# Patient Record
Sex: FEMALE | Race: WHITE | NOT HISPANIC OR LATINO | Employment: OTHER | URBAN - METROPOLITAN AREA
[De-identification: names, ages, dates, MRNs, and addresses within clinical notes are randomized per-mention and may not be internally consistent; named-entity substitution may affect disease eponyms.]

---

## 2024-11-22 ENCOUNTER — APPOINTMENT (OUTPATIENT)
Dept: OTHER | Facility: HOSPITAL | Age: 69
End: 2024-11-22
Payer: MEDICARE

## 2024-11-23 ENCOUNTER — INPATIENT HOSPITAL (OUTPATIENT)
Age: 69
End: 2024-11-23
Payer: MEDICARE

## 2024-11-23 ENCOUNTER — HOSPITAL ENCOUNTER (INPATIENT)
Facility: HOSPITAL | Age: 69
LOS: 1 days | Discharge: HOME | End: 2024-11-24
Attending: STUDENT IN AN ORGANIZED HEALTH CARE EDUCATION/TRAINING PROGRAM | Admitting: STUDENT IN AN ORGANIZED HEALTH CARE EDUCATION/TRAINING PROGRAM
Payer: MEDICARE

## 2024-11-23 ENCOUNTER — INPATIENT HOSPITAL (OUTPATIENT)
Dept: URBAN - METROPOLITAN AREA HOSPITAL 84 | Facility: HOSPITAL | Age: 69
End: 2024-11-23
Payer: MEDICARE

## 2024-11-23 ENCOUNTER — APPOINTMENT (OUTPATIENT)
Dept: GENERAL RADIOLOGY | Facility: HOSPITAL | Age: 69
End: 2024-11-23
Payer: MEDICARE

## 2024-11-23 DIAGNOSIS — R74.8 ABNORMAL LEVELS OF OTHER SERUM ENZYMES: ICD-10-CM

## 2024-11-23 PROBLEM — R10.9 INTRACTABLE ABDOMINAL PAIN: Status: ACTIVE | Noted: 2024-11-23

## 2024-11-23 LAB
ALBUMIN SERPL-MCNC: 3.9 G/DL (ref 3.5–5.2)
ALBUMIN/GLOB SERPL: 1.4 G/DL
ALP SERPL-CCNC: 79 U/L (ref 39–117)
ALT SERPL W P-5'-P-CCNC: 14 U/L (ref 1–33)
AMYLASE SERPL-CCNC: 78 U/L (ref 28–100)
ANION GAP SERPL CALCULATED.3IONS-SCNC: 5.5 MMOL/L (ref 5–15)
APTT PPP: 24 SECONDS (ref 22.7–35.4)
AST SERPL-CCNC: 13 U/L (ref 1–32)
BILIRUB SERPL-MCNC: 0.8 MG/DL (ref 0–1.2)
BUN SERPL-MCNC: 12 MG/DL (ref 8–23)
BUN/CREAT SERPL: 12.4 (ref 7–25)
CALCIUM SPEC-SCNC: 9.4 MG/DL (ref 8.6–10.5)
CHLORIDE SERPL-SCNC: 100 MMOL/L (ref 98–107)
CHOLEST SERPL-MCNC: 161 MG/DL (ref 0–200)
CK SERPL-CCNC: 29 U/L (ref 20–180)
CO2 SERPL-SCNC: 29.5 MMOL/L (ref 22–29)
CREAT SERPL-MCNC: 0.97 MG/DL (ref 0.57–1)
CRP SERPL-MCNC: 8.89 MG/DL (ref 0–0.5)
D-LACTATE SERPL-SCNC: 0.8 MMOL/L (ref 0.5–2)
DEPRECATED RDW RBC AUTO: 44.6 FL (ref 37–54)
EGFRCR SERPLBLD CKD-EPI 2021: 63 ML/MIN/1.73
EOSINOPHIL # BLD MANUAL: 0.34 10*3/MM3 (ref 0–0.4)
EOSINOPHIL NFR BLD MANUAL: 3 % (ref 0.3–6.2)
ERYTHROCYTE [DISTWIDTH] IN BLOOD BY AUTOMATED COUNT: 12.7 % (ref 12.3–15.4)
GEN 5 2HR TROPONIN T REFLEX: 12 NG/L
GLOBULIN UR ELPH-MCNC: 2.7 GM/DL
GLUCOSE SERPL-MCNC: 143 MG/DL (ref 65–99)
HBA1C MFR BLD: 6.29 % (ref 4.8–5.6)
HCT VFR BLD AUTO: 37.9 % (ref 34–46.6)
HDLC SERPL-MCNC: 70 MG/DL (ref 40–60)
HGB BLD-MCNC: 12.8 G/DL (ref 12–15.9)
INR PPP: 1.01 (ref 0.9–1.1)
LARGE PLATELETS: ABNORMAL
LDLC SERPL CALC-MCNC: 78 MG/DL (ref 0–100)
LDLC/HDLC SERPL: 1.11 {RATIO}
LIPASE SERPL-CCNC: 88 U/L (ref 13–60)
LYMPHOCYTES # BLD MANUAL: 2.64 10*3/MM3 (ref 0.7–3.1)
LYMPHOCYTES NFR BLD MANUAL: 4 % (ref 5–12)
MAGNESIUM SERPL-MCNC: 2 MG/DL (ref 1.6–2.4)
MCH RBC QN AUTO: 32.2 PG (ref 26.6–33)
MCHC RBC AUTO-ENTMCNC: 33.8 G/DL (ref 31.5–35.7)
MCV RBC AUTO: 95.5 FL (ref 79–97)
MONOCYTES # BLD: 0.46 10*3/MM3 (ref 0.1–0.9)
NEUTROPHILS # BLD AUTO: 8.04 10*3/MM3 (ref 1.7–7)
NEUTROPHILS NFR BLD MANUAL: 67 % (ref 42.7–76)
NEUTS BAND NFR BLD MANUAL: 3 % (ref 0–5)
NEUTS VAC BLD QL SMEAR: ABNORMAL
NT-PROBNP SERPL-MCNC: 144 PG/ML (ref 0–900)
PHOSPHATE SERPL-MCNC: 3.8 MG/DL (ref 2.5–4.5)
PLATELET # BLD AUTO: 214 10*3/MM3 (ref 140–450)
PMV BLD AUTO: 10.8 FL (ref 6–12)
POTASSIUM SERPL-SCNC: 4.9 MMOL/L (ref 3.5–5.2)
PROCALCITONIN SERPL-MCNC: 0.06 NG/ML (ref 0–0.25)
PROT SERPL-MCNC: 6.6 G/DL (ref 6–8.5)
PROTHROMBIN TIME: 13.4 SECONDS (ref 11.7–14.2)
RBC # BLD AUTO: 3.97 10*6/MM3 (ref 3.77–5.28)
RBC MORPH BLD: NORMAL
SODIUM SERPL-SCNC: 135 MMOL/L (ref 136–145)
TRIGL SERPL-MCNC: 65 MG/DL (ref 0–150)
TROPONIN T DELTA: 5 NG/L
TROPONIN T SERPL HS-MCNC: 7 NG/L
TROPONIN T SERPL HS-MCNC: 8 NG/L
TSH SERPL DL<=0.05 MIU/L-ACNC: 1.37 UIU/ML (ref 0.27–4.2)
VARIANT LYMPHS NFR BLD MANUAL: 19 % (ref 19.6–45.3)
VARIANT LYMPHS NFR BLD MANUAL: 4 % (ref 0–5)
VLDLC SERPL-MCNC: 13 MG/DL (ref 5–40)
WBC NRBC COR # BLD AUTO: 11.49 10*3/MM3 (ref 3.4–10.8)

## 2024-11-23 PROCEDURE — 99221 1ST HOSP IP/OBS SF/LOW 40: CPT | Performed by: NURSE PRACTITIONER

## 2024-11-23 PROCEDURE — 25810000003 LACTATED RINGERS PER 1000 ML: Performed by: STUDENT IN AN ORGANIZED HEALTH CARE EDUCATION/TRAINING PROGRAM

## 2024-11-23 PROCEDURE — 83690 ASSAY OF LIPASE: CPT | Performed by: NURSE PRACTITIONER

## 2024-11-23 PROCEDURE — 84484 ASSAY OF TROPONIN QUANT: CPT | Performed by: NURSE PRACTITIONER

## 2024-11-23 PROCEDURE — 83735 ASSAY OF MAGNESIUM: CPT | Performed by: NURSE PRACTITIONER

## 2024-11-23 PROCEDURE — 83605 ASSAY OF LACTIC ACID: CPT | Performed by: NURSE PRACTITIONER

## 2024-11-23 PROCEDURE — 99221 1ST HOSP IP/OBS SF/LOW 40: CPT | Performed by: SURGERY

## 2024-11-23 PROCEDURE — 71045 X-RAY EXAM CHEST 1 VIEW: CPT

## 2024-11-23 PROCEDURE — 97162 PT EVAL MOD COMPLEX 30 MIN: CPT

## 2024-11-23 PROCEDURE — 80053 COMPREHEN METABOLIC PANEL: CPT | Performed by: NURSE PRACTITIONER

## 2024-11-23 PROCEDURE — 84100 ASSAY OF PHOSPHORUS: CPT | Performed by: NURSE PRACTITIONER

## 2024-11-23 PROCEDURE — 84145 PROCALCITONIN (PCT): CPT | Performed by: NURSE PRACTITIONER

## 2024-11-23 PROCEDURE — 82550 ASSAY OF CK (CPK): CPT | Performed by: NURSE PRACTITIONER

## 2024-11-23 PROCEDURE — 83036 HEMOGLOBIN GLYCOSYLATED A1C: CPT | Performed by: NURSE PRACTITIONER

## 2024-11-23 PROCEDURE — 63710000001 PREDNISONE PER 1 MG: Performed by: NURSE PRACTITIONER

## 2024-11-23 PROCEDURE — 85007 BL SMEAR W/DIFF WBC COUNT: CPT | Performed by: NURSE PRACTITIONER

## 2024-11-23 PROCEDURE — 80061 LIPID PANEL: CPT | Performed by: NURSE PRACTITIONER

## 2024-11-23 PROCEDURE — 84443 ASSAY THYROID STIM HORMONE: CPT | Performed by: NURSE PRACTITIONER

## 2024-11-23 PROCEDURE — 82150 ASSAY OF AMYLASE: CPT | Performed by: NURSE PRACTITIONER

## 2024-11-23 PROCEDURE — 86140 C-REACTIVE PROTEIN: CPT | Performed by: NURSE PRACTITIONER

## 2024-11-23 PROCEDURE — 85730 THROMBOPLASTIN TIME PARTIAL: CPT | Performed by: NURSE PRACTITIONER

## 2024-11-23 PROCEDURE — 85610 PROTHROMBIN TIME: CPT | Performed by: NURSE PRACTITIONER

## 2024-11-23 PROCEDURE — 83880 ASSAY OF NATRIURETIC PEPTIDE: CPT | Performed by: NURSE PRACTITIONER

## 2024-11-23 PROCEDURE — 85027 COMPLETE CBC AUTOMATED: CPT | Performed by: NURSE PRACTITIONER

## 2024-11-23 RX ORDER — PREDNISONE 20 MG/1
40 TABLET ORAL
Status: DISCONTINUED | OUTPATIENT
Start: 2024-11-23 | End: 2024-11-23

## 2024-11-23 RX ORDER — AMOXICILLIN 250 MG
2 CAPSULE ORAL 2 TIMES DAILY PRN
Status: DISCONTINUED | OUTPATIENT
Start: 2024-11-23 | End: 2024-11-24 | Stop reason: HOSPADM

## 2024-11-23 RX ORDER — ATORVASTATIN CALCIUM 10 MG/1
10 TABLET, FILM COATED ORAL DAILY
Status: DISCONTINUED | OUTPATIENT
Start: 2024-11-23 | End: 2024-11-24 | Stop reason: HOSPADM

## 2024-11-23 RX ORDER — SORBITOL SOLUTION 70 %
100 SOLUTION, ORAL MISCELLANEOUS ONCE
Status: COMPLETED | OUTPATIENT
Start: 2024-11-23 | End: 2024-11-23

## 2024-11-23 RX ORDER — ACETAMINOPHEN 325 MG/1
650 TABLET ORAL EVERY 4 HOURS PRN
Status: DISCONTINUED | OUTPATIENT
Start: 2024-11-23 | End: 2024-11-24 | Stop reason: HOSPADM

## 2024-11-23 RX ORDER — KETOROLAC TROMETHAMINE 30 MG/ML
15 INJECTION, SOLUTION INTRAMUSCULAR; INTRAVENOUS EVERY 6 HOURS PRN
Status: DISCONTINUED | OUTPATIENT
Start: 2024-11-23 | End: 2024-11-24 | Stop reason: HOSPADM

## 2024-11-23 RX ORDER — LEVOTHYROXINE SODIUM 25 UG/1
75 TABLET ORAL
Status: DISCONTINUED | OUTPATIENT
Start: 2024-11-23 | End: 2024-11-24 | Stop reason: HOSPADM

## 2024-11-23 RX ORDER — LEVOTHYROXINE SODIUM 50 UG/1
50 TABLET ORAL DAILY
COMMUNITY

## 2024-11-23 RX ORDER — LEVOTHYROXINE SODIUM 50 UG/1
50 TABLET ORAL DAILY
Status: DISCONTINUED | OUTPATIENT
Start: 2024-11-23 | End: 2024-11-23 | Stop reason: SDUPTHER

## 2024-11-23 RX ORDER — AMLODIPINE BESYLATE 5 MG/1
5 TABLET ORAL DAILY
COMMUNITY

## 2024-11-23 RX ORDER — HYDROMORPHONE HYDROCHLORIDE 1 MG/ML
0.5 INJECTION, SOLUTION INTRAMUSCULAR; INTRAVENOUS; SUBCUTANEOUS EVERY 4 HOURS PRN
Status: DISCONTINUED | OUTPATIENT
Start: 2024-11-23 | End: 2024-11-24 | Stop reason: HOSPADM

## 2024-11-23 RX ORDER — AMLODIPINE BESYLATE 5 MG/1
5 TABLET ORAL DAILY
Status: DISCONTINUED | OUTPATIENT
Start: 2024-11-23 | End: 2024-11-24 | Stop reason: HOSPADM

## 2024-11-23 RX ORDER — POLYETHYLENE GLYCOL 3350 17 G/17G
17 POWDER, FOR SOLUTION ORAL DAILY PRN
Status: DISCONTINUED | OUTPATIENT
Start: 2024-11-23 | End: 2024-11-24 | Stop reason: HOSPADM

## 2024-11-23 RX ORDER — SODIUM CHLORIDE 0.9 % (FLUSH) 0.9 %
10 SYRINGE (ML) INJECTION AS NEEDED
Status: DISCONTINUED | OUTPATIENT
Start: 2024-11-23 | End: 2024-11-24 | Stop reason: HOSPADM

## 2024-11-23 RX ORDER — LEVOTHYROXINE SODIUM 25 UG/1
25 TABLET ORAL
COMMUNITY

## 2024-11-23 RX ORDER — ONDANSETRON 2 MG/ML
4 INJECTION INTRAMUSCULAR; INTRAVENOUS EVERY 6 HOURS PRN
Status: DISCONTINUED | OUTPATIENT
Start: 2024-11-23 | End: 2024-11-24 | Stop reason: HOSPADM

## 2024-11-23 RX ORDER — ASPIRIN 81 MG/1
81 TABLET ORAL DAILY
COMMUNITY

## 2024-11-23 RX ORDER — ATORVASTATIN CALCIUM 10 MG/1
10 TABLET, FILM COATED ORAL DAILY
COMMUNITY

## 2024-11-23 RX ORDER — ASPIRIN 81 MG/1
81 TABLET ORAL DAILY
Status: DISCONTINUED | OUTPATIENT
Start: 2024-11-23 | End: 2024-11-24 | Stop reason: HOSPADM

## 2024-11-23 RX ORDER — SODIUM CHLORIDE, SODIUM LACTATE, POTASSIUM CHLORIDE, CALCIUM CHLORIDE 600; 310; 30; 20 MG/100ML; MG/100ML; MG/100ML; MG/100ML
100 INJECTION, SOLUTION INTRAVENOUS CONTINUOUS
Status: DISPENSED | OUTPATIENT
Start: 2024-11-23 | End: 2024-11-24

## 2024-11-23 RX ORDER — NITROGLYCERIN 0.4 MG/1
0.4 TABLET SUBLINGUAL
Status: DISCONTINUED | OUTPATIENT
Start: 2024-11-23 | End: 2024-11-24 | Stop reason: HOSPADM

## 2024-11-23 RX ORDER — SODIUM CHLORIDE 9 MG/ML
40 INJECTION, SOLUTION INTRAVENOUS AS NEEDED
Status: DISCONTINUED | OUTPATIENT
Start: 2024-11-23 | End: 2024-11-24 | Stop reason: HOSPADM

## 2024-11-23 RX ORDER — SODIUM CHLORIDE 0.9 % (FLUSH) 0.9 %
10 SYRINGE (ML) INJECTION EVERY 12 HOURS SCHEDULED
Status: DISCONTINUED | OUTPATIENT
Start: 2024-11-23 | End: 2024-11-24 | Stop reason: HOSPADM

## 2024-11-23 RX ORDER — DICYCLOMINE HYDROCHLORIDE 10 MG/1
20 CAPSULE ORAL 4 TIMES DAILY PRN
Status: DISCONTINUED | OUTPATIENT
Start: 2024-11-23 | End: 2024-11-24 | Stop reason: HOSPADM

## 2024-11-23 RX ORDER — PANTOPRAZOLE SODIUM 40 MG/10ML
40 INJECTION, POWDER, LYOPHILIZED, FOR SOLUTION INTRAVENOUS
Status: DISCONTINUED | OUTPATIENT
Start: 2024-11-23 | End: 2024-11-24 | Stop reason: HOSPADM

## 2024-11-23 RX ORDER — BISACODYL 10 MG
10 SUPPOSITORY, RECTAL RECTAL DAILY PRN
Status: DISCONTINUED | OUTPATIENT
Start: 2024-11-23 | End: 2024-11-24 | Stop reason: HOSPADM

## 2024-11-23 RX ORDER — ALBUTEROL SULFATE 90 UG/1
1 INHALANT RESPIRATORY (INHALATION) EVERY 4 HOURS PRN
COMMUNITY

## 2024-11-23 RX ORDER — BISACODYL 5 MG/1
5 TABLET, DELAYED RELEASE ORAL DAILY PRN
Status: DISCONTINUED | OUTPATIENT
Start: 2024-11-23 | End: 2024-11-24 | Stop reason: HOSPADM

## 2024-11-23 RX ORDER — LANOLIN ALCOHOL/MO/W.PET/CERES
1000 CREAM (GRAM) TOPICAL DAILY
COMMUNITY

## 2024-11-23 RX ADMIN — ATORVASTATIN CALCIUM 10 MG: 10 TABLET, FILM COATED ORAL at 08:22

## 2024-11-23 RX ADMIN — ASPIRIN 81 MG: 81 TABLET, COATED ORAL at 08:23

## 2024-11-23 RX ADMIN — Medication 10 ML: at 01:52

## 2024-11-23 RX ADMIN — SODIUM CHLORIDE, SODIUM LACTATE, POTASSIUM CHLORIDE, CALCIUM CHLORIDE 100 ML/HR: 20; 30; 600; 310 INJECTION, SOLUTION INTRAVENOUS at 13:24

## 2024-11-23 RX ADMIN — SODIUM CHLORIDE, SODIUM LACTATE, POTASSIUM CHLORIDE, CALCIUM CHLORIDE 100 ML/HR: 20; 30; 600; 310 INJECTION, SOLUTION INTRAVENOUS at 23:05

## 2024-11-23 RX ADMIN — PANTOPRAZOLE SODIUM 40 MG: 40 INJECTION, POWDER, FOR SOLUTION INTRAVENOUS at 02:02

## 2024-11-23 RX ADMIN — LEVOTHYROXINE SODIUM 75 MCG: 0.03 TABLET ORAL at 06:49

## 2024-11-23 RX ADMIN — Medication 10 ML: at 10:35

## 2024-11-23 RX ADMIN — AMLODIPINE BESYLATE 5 MG: 5 TABLET ORAL at 08:23

## 2024-11-23 RX ADMIN — PREDNISONE 40 MG: 20 TABLET ORAL at 08:22

## 2024-11-23 RX ADMIN — SORBITOL SOLUTION (BULK) 100 ML: 70 SOLUTION at 13:24

## 2024-11-23 NOTE — THERAPY EVALUATION
Patient Name: Brandie Maldonado  : 1955    MRN: [id number]                              Today's Date: 2024       Admit Date: 2024    Visit Dx: No diagnosis found.  Patient Active Problem List   Diagnosis    Intractable abdominal pain     Past Medical History:   Diagnosis Date    Arthritis     COPD (chronic obstructive pulmonary disease)     Elevated cholesterol     Hypertension     Hypothyroid      Past Surgical History:   Procedure Laterality Date    APPENDECTOMY      1980s    CHOLECYSTECTOMY          HYSTERECTOMY  1970    partial hysterectomy      General Information       Row Name 24 183          Physical Therapy Time and Intention    Document Type evaluation  -EL     Mode of Treatment individual therapy;physical therapy  -       Row Name 24 183          General Information    Prior Level of Function independent:  -       Row Name 24 183          Living Environment    People in Home significant other;grandchild(silvestre)  -       Row Name 24 183          Cognition    Orientation Status (Cognition) oriented x 4  -       Row Name 24          Safety Issues/Impairments Affecting Functional Mobility    Impairments Affecting Function (Mobility) pain  -EL               User Key  (r) = Recorded By, (t) = Taken By, (c) = Cosigned By      Initials Name Provider Type    EL Elias Smallwood, PT Physical Therapist                   Mobility       Row Name 24          Bed Mobility    Bed Mobility bed mobility (all) activities  -EL     All Activities, [place] (Bed Mobility) modified independence  -     Assistive Device (Bed Mobility) bed rails  -       Row Name 24 183          Bed-Chair Transfer    Bed-Chair [place] (Transfers) standby assist  -       Row Name 24 183          Sit-Stand Transfer    Sit-Stand [place] (Transfers) standby assist  -       Row Name 24 183          Gait/Stairs (Locomotion)    [place] Level  (Gait) standby assist  -EL     Distance in Feet (Gait) 150  -EL     Comment, (Gait/Stairs) No significant balance deficits  -EL               User Key  (r) = Recorded By, (t) = Taken By, (c) = Cosigned By      Initials Name Provider Type    Elias Cid, PT Physical Therapist                   Obj/Interventions       Row Name 11/23/24 1836          Range of Motion Comprehensive    General Range of Motion bilateral lower extremity ROM WFL  -EL       Row Name 11/23/24 1836          Strength Comprehensive (MMT)    General Manual Muscle Testing (MMT) Assessment no strength deficits identified  -EL     Comment, General Manual Muscle Testing (MMT) Assessment BLE 4/5 gross  -EL       Row Name 11/23/24 1836          Sensory Assessment (Somatosensory)    Sensory Assessment (Somatosensory) sensation intact  -EL               User Key  (r) = Recorded By, (t) = Taken By, (c) = Cosigned By      Initials Name Provider Type    Elias Cid, PT Physical Therapist                   Goals/Plan    No documentation.                  Clinical Impression       Row Name 11/23/24 1837          Pain    Pretreatment Pain Rating 1/10  -EL     Posttreatment Pain Rating 1/10  -EL     Pain Location abdomen  -       Row Name 11/23/24 1837          Plan of Care Review    Plan of Care Reviewed With patient;significant other  -EL     Outcome Evaluation Pt is a 69 YO F admitted from [facility], with abdominal pain. Pt reports this date it is at 1/10, and was awaiting GI and gen sx consult. Pt reports living at home with SO and 3 grandchildren is independent with all ADLs, ambluation without AD and no recnet falls. Pt is a caregiver for 4 elderly ladies as well. This date pt requires no physical assistance with bed mobility, tranfsers or ambulation. Pt appears to be safe to return home wiht SO. PT to sign off at this time, and if status changes new orders required.  -EL       Row Name 11/23/24 1837          Therapy Assessment/Plan (PT)     Criteria for Skilled Interventions Met (PT) no problems identified which require skilled intervention  -EL     Therapy Frequency (PT) evaluation only  -EL       Row Name 11/23/24 1837          Vital Signs    O2 Delivery Pre Treatment room air  -EL     O2 Delivery Intra Treatment room air  -EL     O2 Delivery Post Treatment room air  -EL     Pre Patient Position Supine  -EL     Intra Patient Position Standing  -EL     Post Patient Position Sitting  -EL       Row Name 11/23/24 1837          Positioning and Restraints    Pre-Treatment Position in bed  -EL     Post Treatment Position chair  -EL     In Chair notified nsg;reclined;call light within reach;encouraged to call for assist;exit alarm on  -EL               User Key  (r) = Recorded By, (t) = Taken By, (c) = Cosigned By      Initials Name Provider Type    Elias Cid, PT Physical Therapist                   Outcome Measures       Row Name 11/23/24 1843 11/23/24 0823       How much help from another person do you currently need...    Turning from your back to your side while in flat bed without using bedrails? 4  -EL 4  -HT (r) RR (t) HT (c)    Moving from lying on back to sitting on the side of a flat bed without bedrails? 4  -EL 4  -HT (r) RR (t) HT (c)    Moving to and from a bed to a chair (including a wheelchair)? 4  -EL 4  -HT (r) RR (t) HT (c)    Standing up from a chair using your arms (e.g., wheelchair, bedside chair)? 4  -EL 4  -HT (r) RR (t) HT (c)    Climbing 3-5 steps with a railing? 4  -EL 4  -HT (r) RR (t) HT (c)    To walk in hospital room? 4  -EL 3  -HT (r) RR (t) HT (c)    AM-PAC 6 Clicks Score (PT) 24  -EL 23  -RR    Highest Level of Mobility Goal 8 --> Walked 250 feet or more  -EL 7 --> Walk 25 feet or more  -RR      Row Name 11/23/24 1843          Functional Assessment    Outcome Measure Options AM-PAC 6 Clicks Basic Mobility (PT)  -EL               User Key  (r) = Recorded By, (t) = Taken By, (c) = Cosigned By      Initials Name Provider  Type    EL Elias Smallwood, PT Physical Therapist    Pauline Alvarado RN Registered Nurse    Zayra White LPN Licensed Nurse                                 Physical Therapy Education       Title: PT OT SLP Therapies (Done)       Topic: Physical Therapy (Done)       Point: Mobility training (Done)       Learning Progress Summary            Patient Acceptance, E,TB, VU by  at 11/23/2024 1843                      Point: Precautions (Done)       Learning Progress Summary            Patient Acceptance, E,TB, VU by  at 11/23/2024 1843                                      User Key       Initials Effective Dates Name Provider Type Discipline     06/23/20 -  Elias Smallwood, PT Physical Therapist PT                  PT Recommendation and Plan     Outcome Evaluation: Pt is a 67 YO F admitted from [facility], with abdominal pain. Pt reports this date it is at 1/10, and was awaiting GI and gen sx consult. Pt reports living at home with SO and 3 grandchildren is independent with all ADLs, ambluation without AD and no recnet falls. Pt is a caregiver for 4 elderly ladies as well. This date pt requires no physical assistance with bed mobility, tranfsers or ambulation. Pt appears to be safe to return home wiht SO. PT to sign off at this time, and if status changes new orders required.     Time Calculation:   PT Evaluation Complexity  History, PT Evaluation Complexity: 1-2 personal factors and/or comorbidities  Examination of Body Systems (PT Eval Complexity): total of 3 or more elements  Clinical Presentation (PT Evaluation Complexity): evolving  Clinical Decision Making (PT Evaluation Complexity): moderate complexity  Overall Complexity (PT Evaluation Complexity): moderate complexity     PT Charges       Row Name 11/23/24 1843             Time Calculation    Start Time 0947  -EL      Stop Time 1000  -EL      Time Calculation (min) 13 min  -EL      PT Received On 11/23/24  -EL                User Key  (r) =  Recorded By, (t) = Taken By, (c) = Cosigned By      Initials Name Provider Type    Elias Cid, PT Physical Therapist                  Therapy Charges for Today       Code Description Service Date Service Provider Modifiers Qty    93419022575 HC PT EVAL MOD COMPLEXITY 3 11/23/2024 Elias Smallwood, PT GP 1            PT G-Codes  Outcome Measure Options: AM-PAC 6 Clicks Basic Mobility (PT)  AM-PAC 6 Clicks Score (PT): 24  PT Discharge Summary  Anticipated Discharge Disposition (PT): home with assist    Elias Smallwood PT  11/23/2024

## 2024-11-23 NOTE — PLAN OF CARE
Goal Outcome Evaluation:         Patient admitted for abdominal pain, radiating to right back. Abdomen is soft, tender to touch. Denies any need for pain medication during this assessment. Heart/oxygen monitor in place. Plan of care initiated.  Patient is alert and oriented x 4. Significant other is at bedside.

## 2024-11-23 NOTE — PLAN OF CARE
Goal Outcome Evaluation:  Plan of Care Reviewed With: patient, significant other           Outcome Evaluation: Pt is a 69 YO F admitted from [facility] hospital, with abdominal pain. Pt reports this date it is at 1/10, and was awaiting GI and gen sx consult. Pt reports living at home with SO and 3 grandchildren is independent with all ADLs, ambluation without AD and no recnet falls. Pt is a caregiver for 4 elderly ladies as well. This date pt requires no physical assistance with bed mobility, tranfsers or ambulation. Pt appears to be safe to return home wiht SO. PT to sign off at this time, and if status changes new orders required.    Anticipated Discharge Disposition (PT): home with assist

## 2024-11-23 NOTE — H&P
"[facility] Medicine Services  History & Physical    Patient Name: Brandie Maldonado  : 1955  MRN: [id number]  Primary Care Physician:  Stephanie Arciniega  Date of admission: 2024  Date and Time of Service: 2024 at 0055    Subjective      Chief Complaint: abdominal pain    History of Present Illness: Brandie Maldonado is a 68 y.o. female with a CMH of GERD, hypothyroidism, HLD, HTN, cholecystectomy, hysterectomy, appendectomy who presented to [facility] on 2024 with abdominal pain. Lives at home, independent with ADLs.   Was seen at [facility] on 10/31 for PNA, treated with oral antibiotics and steroids.   Presented to [facility] ED on  for right sided abdominal pain and right flank pain over past 24 hours accompanied with nausea. Denies injury or trauma. Work up at OSH revealed CXR negative, UA negative, troponin negative at 4, BNP normal at 44, BUN 16, creatinine 0.81, lactate 1.2, lipase 950, magnesium 1.9, WBC 13.3, Hgb 42.8, UDS negative. CT abd/pelvis revealed moderate hiatal hernia, no free intraperitoneal air or fluid, increased attenuation in mesentery with small to borderline size lymph nodes suggestive of \"iam mesentery\". Due to intractable abdominal pain, transfer to this facility requested for higher level of care.     Upon evaluation, awake, alert, resting in bed, SO at bedside. Current VS: 98.2-60-/76-97% room air. Rates abdominal pain 3/10 after IV fentanyl at OSH. On exam with diffuse abdominal tenderness worse RLQ. Denies blood in stool or urine.   Admission labs pending.       Review of Systems   Constitutional:  Positive for fatigue. Negative for chills and fever.   Respiratory:  Negative for shortness of breath.    Cardiovascular:  Negative for chest pain.   Gastrointestinal:  Positive for abdominal pain and nausea. Negative for blood in stool, diarrhea and vomiting.   Genitourinary:  Negative for dysuria.   Musculoskeletal:         Right " flank pain   Neurological:  Positive for weakness.       Personal History     PMH: HTN, HLD, hypothyroidism.     PSH: appendectomy, hysterectomy, cholecystectomy.     Family History: family history is not on file. Otherwise pertinent FHx was reviewed and not pertinent to current issue.    Social History:      Home Medications:  Prior to Admission Medications       None              Allergies:  No Known Allergies    Objective      Vitals:   Temp:  [98.2 °F (36.8 °C)] 98.2 °F (36.8 °C)  Heart Rate:  [60] 60  Resp:  [15] 15  BP: (132)/(76) 132/76  Body mass index is 36.79 kg/m².    Physical Exam  Constitutional:       Appearance: awake, alert, APPEARS WEAK.   HENT:      Head: Normocephalic and atraumatic.      Mouth/Throat:      Mouth: Mucous membranes are moist.   Eyes:      General: No scleral icterus.     Extraocular Movements: Extraocular movements intact.      Pupils: Pupils are equal, round, and reactive to light.   Cardiovascular:      Rate and Rhythm: Normal rate and regular rhythm.      Pulses: Normal pulses.      Heart sounds: Normal heart sounds.   Pulmonary:      Effort: Pulmonary effort is normal.      Breath sounds: Normal breath sounds.   Abdominal:      General: Bowel sounds are normal. There is no distension.      Palpations: Abdomen is soft.      Tenderness: DIFFUSE ABDOMINAL TENDERNESS WORSE RLQ WITHOUT GUARDING OR REBOUND TENDERNESS.   Musculoskeletal:      Right lower leg: no edema.      Left lower leg: No edema.   Skin:     General: Skin is warm and dry. PALE, SLIGHTLY SALLOW    Neurological:      Mental Status: She is alert and oriented to person, place, and time. Mental status is at baseline.      Motor: Weakness present.   Diagnostic Data:  Lab Results (last 24 hours)       ** No results found for the last 24 hours. **             Imaging Results (Last 24 Hours)       ** No results found for the last 24 hours. **              Assessment & Plan        This is a 68 y.o. female with PMH of GERD,  "hypothyroidism, HLD, HTN, cholecystectomy, hysterectomy, appendectomy who presented to OSH for right sided abdominal pain, right flank pain with nausea for 24 hours. OSH workup revealed WBC 13.3 and CT imaging of increased attenuation in mesentery with small to borderline size lymph nodes suggestive of \"iam mesentery\". Transfer to this facility requested for intractable abdominal pain.         Active and Resolved Problems  Active Hospital Problems    Diagnosis  POA    **Intractable abdominal pain [R10.9]  Yes      Resolved Hospital Problems   No resolved problems to display.     Intractable abdominal pain:  -presented to OSH with right sided abdominal and flank pain over past 24 hour with nausea.   -OSH CT revealed increased attenuation in mesentery with small to borderline size lymph nodes suggestive of \"iam mesentery\"  -OSH lipase 950, repeat on admission. OSH CT revealed unremarkable pancreas.   -does not meet SIRS criteria on admission  -admission labs pending, including CPR and ESR  -pain control  -antiemetics   -Prednisone 40 mg po daily  -consider hem/onc consult pending clinical course.   -consider general surgery consult pending clinical course.     Hypothyroidism:  -TSH pending  -continue home levothyroxine dosing once verified by pharmacy.     GERD:  -Protonix for GI PPx            VTE Prophylaxis:  Mechanical VTE prophylaxis orders are present.        The patient desires to be as follows:    CODE STATUS:    Medical Intervention Limits: No intubation (DNI)  Code Status (Patient has no pulse and is not breathing): No CPR (Do Not Attempt to Resuscitate)  Medical Interventions (Patient has pulse or is breathing): Limited Support      Admission Status:  I believe this patient meets IP status.    Expected Length of Stay: 3 days    PDMP and Medication Dispenses via Sidebar reviewed and consistent with patient reported medications.    I discussed the patient's findings and my recommendations with patient, " family, and nursing staff.      Signature:     This document has been electronically signed by ZOHAIB Grewal on November 23, 2024 01:29 [facility] Hospitalist Team

## 2024-11-23 NOTE — CONSULTS
"GI CONSULT  NOTE:    Referring Provider:    Dr Lopez      Chief complaint:    Elevated lipase, mesenteric lymphadenopathy    Subjective   Right sided abdominal/flank pain     History of present illness:     Patient is a 68-year-old female with past medical history of GERD, cholecystectomy 2021, hypothyroidism, hypertension, COPD who went to [facility] 11/22/2024 with a complaint of right abdominal pain/right flank pain and nausea.  Patient had symptoms for 24 hours prior to going to ER.  Patient was evaluated in the ER had a lipase of 950.  CT showed moderate hiatal hernia and iam mesentery.  Patient was transferred to [facility] on 11/23/2024 for further evaluation.  Patient was evaluated by general surgery which ordered GI consult.  Dates her symptoms started suddenly on Friday, 11/22/2024.  Patient states it felt like she was bloated in the right upper quadrant and radiated to her right flank.  Patient states she had a solid brown bowel movement yesterday morning denies any color change or size in her stool.  Denies any blood or black in her stool.  Patient states she felt like the right flank pain was related to a\" sprung muscle.\"  Denies any history of kidney stones.  States symptoms are not exacerbated by eating.  Patient states she was not able to get any comfortable position due to the pain.  Patient did take a couple ibuprofen and also Tums.  Patient denies any routine NSAID usage.  Patient states every time she took the Tums she would pass gas.  Patient denies any ill contacts, buffets, uncooked or undercooked food intake.  Denies any fever or chills, headache.  Positive for weight gain.  Denies any dysphagia.    LABS: Sodium 135, creatinine 0.97.  LFTs are normal.  INR 1.  WBCs 11.49, hemoglobin 12.8, platelets 214.  Lipase 88.    Endo History:  Nothing in G med.  EGD 2021 prior to cholecystectomy.  Patient states EGD was negative.  Prior colonoscopy less than 5 years ago with " Dr. Kiran at [facility].  Patient states she thinks she had polyps.  Denies any hemorrhoids or diverticulosis.  She is unsure of recall.    Past Medical History:  Past Medical History:   Diagnosis Date    Arthritis     COPD (chronic obstructive pulmonary disease)     Elevated cholesterol     Hypertension     Hypothyroid        Past Surgical History:  Past Surgical History:   Procedure Laterality Date    APPENDECTOMY      1980s    CHOLECYSTECTOMY      2021    HYSTERECTOMY  1970    partial hysterectomy       Social History: Patient denies any alcohol, tobacco, illicit drugs  Social History     Tobacco Use    Smoking status: Never    Smokeless tobacco: Never   Vaping Use    Vaping status: Never Used   Substance Use Topics    Alcohol use: Never    Drug use: Never       Family History: Patient denies any history of GI cancers or disorders.  Father had lung cancer and mom had heart issues.  History reviewed. No pertinent family history.    Medications:  Medications Prior to Admission   Medication Sig Dispense Refill Last Dose/Taking    atorvastatin (LIPITOR) 10 MG tablet Take 1 tablet by mouth Daily.   11/22/2024    albuterol sulfate  (90 Base) MCG/ACT inhaler Inhale 1 puff Every 4 (Four) Hours As Needed for Wheezing.       amLODIPine (NORVASC) 5 MG tablet Take 1 tablet by mouth Daily.       aspirin 81 MG EC tablet Take 1 tablet by mouth Daily.       levothyroxine (SYNTHROID, LEVOTHROID) 25 MCG tablet Take 1 tablet by mouth Every Morning.       levothyroxine (SYNTHROID, LEVOTHROID) 50 MCG tablet Take 1 tablet by mouth Daily.       vitamin B-12 (CYANOCOBALAMIN) 1000 MCG tablet Take 1 tablet by mouth Daily.          Scheduled Meds:amLODIPine, 5 mg, Oral, Daily  aspirin, 81 mg, Oral, Daily  atorvastatin, 10 mg, Oral, Daily  levothyroxine, 75 mcg, Oral, Q AM  pantoprazole, 40 mg, Intravenous, Q AM  predniSONE, 40 mg, Oral, Daily With Breakfast  sodium chloride, 10 mL, Intravenous, Q12H      Continuous Infusions:   PRN  "Meds:.  acetaminophen    senna-docusate sodium **AND** polyethylene glycol **AND** bisacodyl **AND** bisacodyl    HYDROmorphone    ketorolac    Magnesium Standard Dose Replacement - Follow Nurse / BPA Driven Protocol    nitroglycerin    ondansetron    Phosphorus Replacement - Follow Nurse / BPA Driven Protocol    Potassium Replacement - Follow Nurse / BPA Driven Protocol    sodium chloride    sodium chloride    ALLERGIES:  Patient has no known allergies.    ROS:  Review of Systems   Constitutional:  Negative for chills, fever and unexpected weight change.   Gastrointestinal:  Positive for abdominal pain and nausea. Negative for anal bleeding, blood in stool, constipation, diarrhea, rectal pain and vomiting.   Genitourinary:  Positive for flank pain.       The following systems were reviewed and negative; positive for weight gain  Constitution:  No fevers, chills, no unintentional weight loss  Skin: no rash, no jaundice  Eyes:  No blurry vision, no eye pain  HENT:  No change in hearing or smell  Resp:  No dyspnea or cough  CV:  No chest pain or palpitations  :  No dysuria, hematuria  Musculoskeletal:  No leg cramps or arthralgias  Neuro:  No tremor, no numbness  Psych:  No depression or confusion    Objective  Rm 4128. Sitting up in chair. NAD. Family briefly present.     Vital Signs:   Vitals:    11/23/24 0114 11/23/24 0443 11/23/24 0823   BP: 132/76 117/70 127/79   BP Location: Right arm Right arm    Patient Position: Lying Lying    Pulse: 60 60 65   Resp: 15 17    Temp: 98.2 °F (36.8 °C) 98.3 °F (36.8 °C)    TempSrc: Oral Oral    SpO2: 97% 95%    Weight: 77.1 kg (170 lb)     Height: 144.8 cm (57\")         Physical Exam:   General Appearance:    Awake and alert, in no acute distress   Head:    Normocephalic, without obvious abnormality, atraumatic   Eyes:            Conjunctivae normal, anicteric sclerae, pupils equal   Ears:    Ears appear intact with no abnormalities noted   Throat:   No oral lesions, no " "thrush, oral mucosa moist   Neck:   Supple, no JVD   Lungs:      respirations regular, even and unlabored        Chest Wall:    No abnormalities observed   Abdomen:       soft, nontender, no rebound or guarding, nondistended, no hepatosplenomegaly   Rectal:     Deferred   Extremities:   Moves all extremities, no edema, no cyanosis   Pulses:   Pulses palpable and equal bilaterally   Skin:   No rash, no jaundice, normal palpation        Neurologic:   Cranial nerves 2 - 12 grossly intact, no asterixis       Results Review:   I reviewed the patient's labs and imaging.  CBC    Results from last 7 days   Lab Units 11/23/24  0230   WBC 10*3/mm3 11.49*   HEMOGLOBIN g/dL 12.8   PLATELETS 10*3/mm3 214     CMP   Results from last 7 days   Lab Units 11/23/24  0230   SODIUM mmol/L 135*   POTASSIUM mmol/L 4.9   CHLORIDE mmol/L 100   CO2 mmol/L 29.5*   BUN mg/dL 12   CREATININE mg/dL 0.97   GLUCOSE mg/dL 143*   ALBUMIN g/dL 3.9   BILIRUBIN mg/dL 0.8   ALK PHOS U/L 79   AST (SGOT) U/L 13   ALT (SGPT) U/L 14   MAGNESIUM mg/dL 2.0   PHOSPHORUS mg/dL 3.8   AMYLASE U/L 78   LIPASE U/L 88*     Cr Clearance Estimated Creatinine Clearance: 49.5 mL/min (by C-G formula based on SCr of 0.97 mg/dL).  Coag   Results from last 7 days   Lab Units 11/23/24  0230   INR  1.01   APTT seconds 24.0     HbA1C   Lab Results   Component Value Date    HGBA1C 6.29 (H) 11/23/2024     Blood Glucose No results found for: \"POCGLU\"  Infection   Results from last 7 days   Lab Units 11/23/24  0230   PROCALCITONIN ng/mL 0.06     UA      Radiology(recent) No radiology results for the last day      ASSESSMENT:  Abdominal pain/flank pain-right  Abnormal CT showing hiatal hernia, iam mesentery  Elevated lipase  GERD  Cholecystectomy 2021  Hypothyroidism  Hypertension  COPD      PLAN:  Patient is 68-year-old female who presented to [facility] on 11/22/2024 for right sided abdominal pain, right flank pain, nausea x 1 day.  Patient was noted to have an elevated " lipase of 950.  CT showed medium hiatal hernia and iam mesentery.  GI was consulted for epigastric pain, increased lipase, mesentery lymphadenopathy.     Patient states sudden onset of right quadrant abdominal pain that radiated to the right flank.  Denies any change in her bowels.  Had nausea but no vomiting.  Lipase was initially 950 at [facility] and is down to 88.  Patient denies her symptoms being worse with eating.  Patient states she felt bloated and passed gas multiple times which helped relieve the pain patient also felt like her right flank pain was related to a muscle.  Will allow low-fat diet.  Patient states her pain and symptoms are 80% better.  Will allow her to eat and see how It affects her times.  Continue PPI.  Bentyl as needed for pain.  Will check a CRP.  Moderate stool noted on CT, will give dose of Sorbitol.   I have requested records from Dr Kiran's office thru our office.     Will discuss assessment and plan with Dr Elmore and change accordingly.      I discussed the patient's findings and my recommendations with the patient.  Pebbles Alston, ZOHAIB  11/23/24  10:10 EST    Time:

## 2024-11-23 NOTE — PROGRESS NOTES
[facility] MEDICINE SERVICE  DAILY PROGRESS NOTE    NAME: Brandie Maldonado  : 1955  MRN: [id number]      LOS: 0 days     PROVIDER OF SERVICE: Fuad Gentile MD    Chief Complaint: Intractable abdominal pain    Subjective:     Interval History:  History taken from: patient  No acute overnight events, patient is aaox3, denies chest pain, SOB, fever or chills, stated she had PNA recently which she was treated for.     Review of Systems:   Review of Systems    Objective:     Vital Signs  Temp:  [98 °F (36.7 °C)-98.5 °F (36.9 °C)] 98 °F (36.7 °C)  Heart Rate:  [60-65] 65  Resp:  [15-17] 15  BP: (117-132)/(70-85) 127/85   Body mass index is 36.79 kg/m².    Physical Exam  General Appearance:  Awake alert   Head:  Atraumatic normocephalic   Eyes:        No sclera icterus   Neck: Normal ROM   Pulm: Clear to auscultation on my exam   Cardio: Heart sounds normal   Extremities: Moves all extremities   Abdomen: Soft tender in RLQ, epigastric, tender to palpation, no rebound on my exam       Musculoskeletal: Moves all extremities   Neuro: Aaox3                Scheduled Meds   amLODIPine, 5 mg, Oral, Daily  aspirin, 81 mg, Oral, Daily  atorvastatin, 10 mg, Oral, Daily  levothyroxine, 75 mcg, Oral, Q AM  pantoprazole, 40 mg, Intravenous, Q AM  sodium chloride, 10 mL, Intravenous, Q12H       PRN Meds     acetaminophen    senna-docusate sodium **AND** polyethylene glycol **AND** bisacodyl **AND** bisacodyl    dicyclomine    HYDROmorphone    ketorolac    Magnesium Standard Dose Replacement - Follow Nurse / BPA Driven Protocol    nitroglycerin    ondansetron    Phosphorus Replacement - Follow Nurse / BPA Driven Protocol    Potassium Replacement - Follow Nurse / BPA Driven Protocol    sodium chloride    sodium chloride   Infusions  lactated ringers, 100 mL/hr, Last Rate: 100 mL/hr (24 1324)          Diagnostic Data    Results from last 7 days   Lab Units 24  0230   WBC 10*3/mm3 11.49*   HEMOGLOBIN g/dL  Physical Therapy    Visit Type: treatment  SUBJECTIVE  Patient agreed to participate in therapy this date.  RN in agreement to work with patient for therapy session.  \" I was able to stand at the sink and wash up today \"  Patient / Family Goal: return to previous functional status, maximize function and return home    Pain   Patient denies pain., Patient does not demonstrate pain behaviors.    At onset of session (out of 10): 0     OBJECTIVE     Cognitive Status   Functional Communication   - Overall Communication Status: within functional limits   - Forms of Communication: verbal  Following Direction   - follows all commands and directions consistently        Bed Mobility  Received pt sitting in bedside recliner  Transfers  Assistive devices: gait belt, 2-wheeled walker  - Sit to stand: stand by assist  - Stand to sit: stand by assist    Ambulation / Gait  - Assistive device: gait belt and 2-wheeled walker  - Distance (feet unless otherwise indicated): 60  - Assist Level: contact guard/touching/steadying assist and stand by assist  - Surface: even  - Description: step through, decreased alexys/pace and heavy reliance on BUE    Stair Ambulation  - Number of steps: 3  - Step Height (inches): 6  Ascend  - Assist Level: gait belt used and minimal assist  - Pattern: step-to and step to leading left  - Railing: bilateral  - Assistive Device: folded walker  Descend   - Assist Level: gait belt used and minimal assist  - Pattern: step to leading right  - Railing: bilateral  - Assistive Device: folded walker  Step stool used to simulate stair negotiation, cues provided for step sequencing     Interventions     Training provided: activity tolerance, balance retraining, bed mobility training, body mechanics, safety training, transfer training, positioning, gait training, HEP training, use of assistive device and stair training  Reviewed BLE ROM HEP (B knee ext, hip flex, and ankle DF/PF)      Skilled input: Verbal  "12.8   HEMATOCRIT % 37.9   PLATELETS 10*3/mm3 214   GLUCOSE mg/dL 143*   CREATININE mg/dL 0.97   BUN mg/dL 12   SODIUM mmol/L 135*   POTASSIUM mmol/L 4.9   AST (SGOT) U/L 13   ALT (SGPT) U/L 14   ALK PHOS U/L 79   BILIRUBIN mg/dL 0.8   ANION GAP mmol/L 5.5       No radiology results for the last day      I reviewed the patient's new clinical results.    Assessment/Plan:   This is a 68 y.o. female with PMH of GERD, hypothyroidism, HLD, HTN, cholecystectomy, hysterectomy, appendectomy who presented to OSH for right sided abdominal pain, right flank pain with nausea for 24 hours. OSH workup revealed WBC 13.3 and CT imaging of increased attenuation in mesentery with small to borderline size lymph nodes suggestive of \"iam mesentery\". Transfer to this facility requested for intractable abdominal pain.      #Intractable Nausea   #Moderate Hiatal hernia  #Iam Mesentry sign on CT on outside facility: Differentials include: ?neoplasm, mesenteric panniculitis, ?pancreatitis meets 2/3 criteria for pancreatitis if elevated lipase outside facility  #GERD  #Recently treated for PNA  #s/p CCKectomy   #S/p hysterectomy   #s/p Appendectomy   #Hypothyroidism      Plan:  -elevated lipase outside facility + abdominal pain, meet the criteria for acute pancreatitis, on my exam not fluid overloaded, will start on  cc/hr, although her pain could be other differentials: Differentials include: mesenteric panniculitis  -Her pain could be related   -Surgery consulted, GI consulted for further evaluation given non-specific findings on the CT   -D/c steroids, Continue other home meds   -AM labs will follow        VTE Prophylaxis:  Mechanical VTE prophylaxis orders are present.         Code status is   Code Status and Medical Interventions: No CPR (Do Not Attempt to Resuscitate); Limited Support; No intubation (DNI)   Ordered at: 11/23/24 0116     Medical Intervention Limits:    No intubation (DNI)     Code Status (Patient has no pulse " instruction/cues, tactile instruction/cues, posture correction and facilitation  Verbal Consent: Writer verbally educated and received verbal consent for hand placement, positioning of patient, and techniques to be performed today from patient for hand placement and palpation for techniques, therapist position for techniques and clothing adjustments for techniques as described above and how they are pertinent to the patient's plan of care.         Education:   - Present and ready to learn: patient  Education provided during session:  - Results of above outlined education: Verbalizes understanding and Demonstrates understanding    ASSESSMENT   Progress: progressing toward goals    Discharge needs based on today's assessment:   - Current level of function: slightly below baseline level of function   - Therapy needs at discharge: therapy 5 or more times per week (pt would benefit from short TRAVIS stay)   - Activities of daily living (ADLs) requiring support at discharge: bed mobility, transfers, ambulation and stairs   - Impairments that require further therapy intervention: activity tolerance, balance, strength, pain and ROM    AM-PAC  - Generalized Prior Level of Function: IND/MOD I ([facility] 22-24)       Key: MOD A=moderate assistance, IND/MOD I=independent/modified independent  - Generalized Current Level of Function     - Current Mobility Score: 18       AM-PAC Scoring Key= >21 Modified Independent; 20-21 Supervision; 18-19 Minimal assist; 13-17 Moderate assist; 9-12 Max assist; <9 Total assist      PLAN (while hospitalized)  Suggestions for next session as indicated:   PT Frequency: 3-5 x per week      PT/OT Mobility Equipment for Discharge: Pt owns:  RW; if dc home would need medicar with lift assist  PT/OT ADL Equipment for Discharge: bedside commode, tub TFR bench, hip kit  Agreement to plan and goals: patient agrees with goals and treatment plan        GOALS  Review Date: 10/20/2024  Long Term Goals: (to be met by  and is not breathing):    No CPR (Do Not Attempt to Resuscitate)     Medical Interventions (Patient has pulse or is breathing):    Limited Support       Plan for disposition: Surgery/GI reccs    Time: 30 minutes    Part of this note may be an electronic transcription/translation of spoken language to printed text using the Dragon Dictation System.    Signature: Electronically signed by Fuad Gentile MD, 11/23/24, 14:14 EST.  [facility] Hospitalist Team    time of discharge from hospital)  Sit to supine: Patient will complete sit to supine modified independent.  Status: progressing/ongoing  Supine to sit: Patient will complete supine to sit modified independent.  Status: progressing/ongoing  Sit to stand: Patient will complete sit to stand transfer with gait belt and 2-wheeled walker, supervision.   Status: progressing/ongoing  Stand to sit: Patient will complete stand to sit transfer with gait belt and 2-wheeled walker, supervision.   Status: progressing/ongoing  Ambulation (even): Patient will ambulate on even surface for 100 feet with gait belt and 2-wheeled walker, supervision.   Status: progressing/ongoing  Home program: patient independent with home program as instructed.   Status: progressing/ongoing  Documented in the chart in the following areas: Assessment/Plan.      Patient at End of Session:   Location: in chair  Safety measures: alarm system in place/re-engaged, call light within reach, equipment intact and lines intact  Handoff to: nurse      Therapy procedure time and total treatment time can be found documented on the Time Entry flowsheet

## 2024-11-23 NOTE — CONSULTS
"GENERAL SURGERY CONSULT      Patient Care Team:  Stephanie Arciniega as PCP - General (Nurse Practitioner)    Chief complaint epigastric and right upper quadrant abdominal pain for 1 day with nausea  Subjective     This is a 68-year-old lady who presents with a 1 day history of epigastric and right upper quadrant pain and nausea.  She had a normal bowel movement yesterday and was able to eat earlier yesterday.  She has had no diarrhea and no constipation.  She has had a history of her gallbladder being removed and around that time she said an upper endoscopy was also performed and it was normal.  She also says that she has had a normal colonoscopy in the past.  She went to an [facility] and a CT scan demonstrated moderate hiatal hernia, no free intraperitoneal air or fluid, increased attenuation in mesentery with small to borderline size lymph nodes suggestive of \"iam mesentery\".  At the [facility] her lipase was 950 however it is only 83 when she arrived here.    And treated with antibiotics and has been on prednisone.    Review of Systems   All systems were reviewed and negative except for:  Gastrointestinal: positive for  nausea, pain and See HPI    History  Past Medical History:   Diagnosis Date    Arthritis     COPD (chronic obstructive pulmonary disease)     Elevated cholesterol     Hypertension     Hypothyroid      Past Surgical History:   Procedure Laterality Date    APPENDECTOMY      1980s    CHOLECYSTECTOMY      2021    HYSTERECTOMY  1970    partial hysterectomy     History reviewed. No pertinent family history.  Social History     Tobacco Use    Smoking status: Never    Smokeless tobacco: Never   Vaping Use    Vaping status: Never Used   Substance Use Topics    Alcohol use: Never    Drug use: Never     Medications Prior to Admission   Medication Sig Dispense Refill Last Dose/Taking    atorvastatin (LIPITOR) 10 MG tablet Take 1 tablet by mouth Daily.   11/22/2024    albuterol sulfate  (90 " Base) MCG/ACT inhaler Inhale 1 puff Every 4 (Four) Hours As Needed for Wheezing.       amLODIPine (NORVASC) 5 MG tablet Take 1 tablet by mouth Daily.       aspirin 81 MG EC tablet Take 1 tablet by mouth Daily.       levothyroxine (SYNTHROID, LEVOTHROID) 25 MCG tablet Take 1 tablet by mouth Every Morning.       levothyroxine (SYNTHROID, LEVOTHROID) 50 MCG tablet Take 1 tablet by mouth Daily.       vitamin B-12 (CYANOCOBALAMIN) 1000 MCG tablet Take 1 tablet by mouth Daily.        Allergies:  Patient has no known allergies.    Objective     Vital Signs  Temp:  [98.2 °F (36.8 °C)-98.3 °F (36.8 °C)] 98.3 °F (36.8 °C)  Heart Rate:  [60-65] 65  Resp:  [15-17] 17  BP: (117-132)/(70-79) 127/79    Physical Exam:      General Appearance:    Alert, cooperative, in no acute distress   Head:    Normocephalic, without obvious abnormality, atraumatic,    Eyes:            Lids and lashes normal, conjunctivae and sclerae normal, no   icterus, no pallor, corneas clear, PERRLA   Ears:    Ears appear intact with no abnormalities noted   Throat:   No oral lesions, no thrush, oral mucosa moist   Neck:   No adenopathy, supple, trachea midline, no thyromegaly, no   carotid bruit, no JVD   Back:     No kyphosis present, no scoliosis present, no skin lesions,      erythema or scars, no tenderness to percussion or                   palpation,   range of motion normal   Lungs:     Clear to auscultation,respirations regular, even and                  unlabored    Heart:    Regular rhythm and normal rate, normal S1 and S2, no            murmur, no gallop, no rub, no click   Chest Wall:    No abnormalities observed   Abdomen:   Tender in the epigastrium and just to the right of the epigastrium.  The rest of the abdomen is nontender.  The abdomen is nondistended.   Rectal:     Deferred   Extremities: No edema, good ROM   Pulses:   Pulses palpable and equal bilaterally   Skin:   No bleeding, bruising or rash   Lymph nodes:   No palpable adenopathy    Neurologic:   Cranial nerves 2 - 12 grossly intact, sensation intact, DTR       present and equal bilaterally     Lab Results (last 24 hours)       Procedure Component Value Units Date/Time    High Sensitivity Troponin T [[id number]]  (Normal) Collected: 11/23/24 0737    Specimen: Blood from Arm, Right Updated: 11/23/24 0804     HS Troponin T 8 ng/L     Narrative:      High Sensitive Troponin T Reference Range:  <14.0 ng/L- Negative Female for AMI  <22.0 ng/L- Negative Male for AMI  >=14 - Abnormal Female indicating possible myocardial injury.  >=22 - Abnormal Male indicating possible myocardial injury.   Clinicians would have to utilize clinical acumen, EKG, Troponin, and serial changes to determine if it is an Acute Myocardial Infarction or myocardial injury due to an underlying chronic condition.         High Sensitivity Troponin T 2Hr [[id number]]  (Abnormal) Collected: 11/23/24 0454    Specimen: Blood Updated: 11/23/24 0528     HS Troponin T 12 ng/L      Troponin T Delta 5 ng/L     Narrative:      High Sensitive Troponin T Reference Range:  <14.0 ng/L- Negative Female for AMI  <22.0 ng/L- Negative Male for AMI  >=14 - Abnormal Female indicating possible myocardial injury.  >=22 - Abnormal Male indicating possible myocardial injury.   Clinicians would have to utilize clinical acumen, EKG, Troponin, and serial changes to determine if it is an Acute Myocardial Infarction or myocardial injury due to an underlying chronic condition.         Hemoglobin A1c [[id number]]  (Abnormal) Collected: 11/23/24 0230    Specimen: Blood Updated: 11/23/24 0327     Hemoglobin A1C 6.29 %     BNP [[id number]]  (Normal) Collected: 11/23/24 0230    Specimen: Blood Updated: 11/23/24 0317     proBNP 144.0 pg/mL     Narrative:      This assay is used as an aid in the diagnosis of individuals suspected of having heart failure. It can be used as an aid in the diagnosis of acute decompensated heart failure (ADHF) in patients presenting with  "signs and symptoms of ADHF to the emergency department (ED). In addition, NT-proBNP of <300 pg/mL indicates ADHF is not likely.    Age Range Result Interpretation  NT-proBNP Concentration (pg/mL:      <50             Positive            >450                   Gray                 300-450                    Negative             <300    50-75           Positive            >900                  Gray                300-900                  Negative            <300      >75             Positive            >1800                  Gray                300-1800                  Negative            <300    Procalcitonin [[id number]]  (Normal) Collected: 11/23/24 0230    Specimen: Blood Updated: 11/23/24 0317     Procalcitonin 0.06 ng/mL     Narrative:      As a Marker for Sepsis (Non-Neonates):    1. <0.5 ng/mL represents a low risk of severe sepsis and/or septic shock.  2. >2 ng/mL represents a high risk of severe sepsis and/or septic shock.    As a Marker for Lower Respiratory Tract Infections that require antibiotic therapy:    PCT on Admission    Antibiotic Therapy       6-12 Hrs later    >0.5                Strongly Recommended  >0.25 - <0.5        Recommended   0.1 - 0.25          Discouraged              Remeasure/reassess PCT  <0.1                Strongly Discouraged     Remeasure/reassess PCT    As 28 day mortality risk marker: \"Change in Procalcitonin Result\" (>80% or <=80%) if Day 0 (or Day 1) and Day 4 values are available. Refer to http://www.[employer]-pct-calculator.com    Change in PCT <=80%  A decrease of PCT levels below or equal to 80% defines a positive change in PCT test result representing a higher risk for 28-day all-cause mortality of patients diagnosed with severe sepsis for septic shock.    Change in PCT >80%  A decrease of PCT levels of more than 80% defines a negative change in PCT result representing a lower risk for 28-day all-cause mortality of patients diagnosed with severe sepsis or septic shock.    "    TSH Rfx On Abnormal To Free T4 [[id number]]  (Normal) Collected: 11/23/24 0230    Specimen: Blood Updated: 11/23/24 0317     TSH 1.370 uIU/mL     Amylase [[id number]]  (Normal) Collected: 11/23/24 0230    Specimen: Blood Updated: 11/23/24 0317     Amylase 78 U/L     CK [[id number]]  (Normal) Collected: 11/23/24 0230    Specimen: Blood Updated: 11/23/24 0317     Creatine Kinase 29 U/L     Comprehensive Metabolic Panel [[id number]]  (Abnormal) Collected: 11/23/24 0230    Specimen: Blood Updated: 11/23/24 0317     Glucose 143 mg/dL      BUN 12 mg/dL      Creatinine 0.97 mg/dL      Sodium 135 mmol/L      Potassium 4.9 mmol/L      Chloride 100 mmol/L      CO2 29.5 mmol/L      Calcium 9.4 mg/dL      Total Protein 6.6 g/dL      Albumin 3.9 g/dL      ALT (SGPT) 14 U/L      AST (SGOT) 13 U/L      Alkaline Phosphatase 79 U/L      Total Bilirubin 0.8 mg/dL      Globulin 2.7 gm/dL      A/G Ratio 1.4 g/dL      BUN/Creatinine Ratio 12.4     Anion Gap 5.5 mmol/L      eGFR 63.0 mL/min/1.73     Narrative:      GFR Normal >60  Chronic Kidney Disease <60  Kidney Failure <15      Lipase [[id number]]  (Abnormal) Collected: 11/23/24 0230    Specimen: Blood Updated: 11/23/24 0317     Lipase 88 U/L     Lipid Panel [[id number]]  (Abnormal) Collected: 11/23/24 0230    Specimen: Blood Updated: 11/23/24 0317     Total Cholesterol 161 mg/dL      Triglycerides 65 mg/dL      HDL Cholesterol 70 mg/dL      LDL Cholesterol  78 mg/dL      VLDL Cholesterol 13 mg/dL      LDL/HDL Ratio 1.11    Narrative:      Cholesterol Reference Ranges  (U.S. Department of Health and Human Services ATP III Classifications)    Desirable          <200 mg/dL  Borderline High    200-239 mg/dL  High Risk          >240 mg/dL      Triglyceride Reference Ranges  (U.S. Department of Health and Human Services ATP III Classifications)    Normal           <150 mg/dL  Borderline High  150-199 mg/dL  High             200-499 mg/dL  Very High        >500 mg/dL    HDL Reference  Ranges  (U.S. Department of Health and Human Services ATP III Classifications)    Low     <40 mg/dl (major risk factor for CHD)  High    >60 mg/dl ('negative' risk factor for CHD)        LDL Reference Ranges  (U.S. Department of Health and Human Services ATP III Classifications)    Optimal          <100 mg/dL  Near Optimal     100-129 mg/dL  Borderline High  130-159 mg/dL  High             160-189 mg/dL  Very High        >189 mg/dL    Magnesium [[id number]]  (Normal) Collected: 11/23/24 0230    Specimen: Blood Updated: 11/23/24 0317     Magnesium 2.0 mg/dL     Phosphorus [[id number]]  (Normal) Collected: 11/23/24 0230    Specimen: Blood Updated: 11/23/24 0317     Phosphorus 3.8 mg/dL     Lactic Acid, Plasma [[id number]]  (Normal) Collected: 11/23/24 0230    Specimen: Blood Updated: 11/23/24 0314     Lactate 0.8 mmol/L     CBC & Differential [[id number]]  (Abnormal) Collected: 11/23/24 0230    Specimen: Blood Updated: 11/23/24 0313    Narrative:      The following orders were created for panel order CBC & Differential.  Procedure                               Abnormality         Status                     ---------                               -----------         ------                     Manual Differential[[id number]]          Abnormal            Final result               CBC Auto Differential[[id number]]        Abnormal            Final result                 Please view results for these tests on the individual orders.    Manual Differential [[id number]]  (Abnormal) Collected: 11/23/24 0230    Specimen: Blood Updated: 11/23/24 0313     Neutrophil % 67.0 %      Lymphocyte % 19.0 %      Monocyte % 4.0 %      Eosinophil % 3.0 %      Bands %  3.0 %      Atypical Lymphocyte % 4.0 %      Neutrophils Absolute 8.04 10*3/mm3      Lymphocytes Absolute 2.64 10*3/mm3      Monocytes Absolute 0.46 10*3/mm3      Eosinophils Absolute 0.34 10*3/mm3      RBC Morphology Normal     Vacuolated Neutrophils Slight/1+     Large Platelets  Slight/1+    CBC Auto Differential [[id number]]  (Abnormal) Collected: 11/23/24 0230    Specimen: Blood Updated: 11/23/24 0313     WBC 11.49 10*3/mm3      RBC 3.97 10*6/mm3      Hemoglobin 12.8 g/dL      Hematocrit 37.9 %      MCV 95.5 fL      MCH 32.2 pg      MCHC 33.8 g/dL      RDW 12.7 %      RDW-SD 44.6 fl      MPV 10.8 fL      Platelets 214 10*3/mm3     High Sensitivity Troponin T [[id number]]  (Normal) Collected: 11/23/24 0230    Specimen: Blood Updated: 11/23/24 0308     HS Troponin T 7 ng/L     Narrative:      High Sensitive Troponin T Reference Range:  <14.0 ng/L- Negative Female for AMI  <22.0 ng/L- Negative Male for AMI  >=14 - Abnormal Female indicating possible myocardial injury.  >=22 - Abnormal Male indicating possible myocardial injury.   Clinicians would have to utilize clinical acumen, EKG, Troponin, and serial changes to determine if it is an Acute Myocardial Infarction or myocardial injury due to an underlying chronic condition.         aPTT [[id number]]  (Normal) Collected: 11/23/24 0230    Specimen: Blood Updated: 11/23/24 0248     PTT 24.0 seconds     Protime-INR [[id number]]  (Normal) Collected: 11/23/24 0230    Specimen: Blood Updated: 11/23/24 0248     Protime 13.4 Seconds      INR 1.01            Imaging Results (Last 24 Hours)       Procedure Component Value Units Date/Time    CT Outside Films [[id number]] Resulted: 11/23/24 0900     Updated: 11/23/24 0900    Narrative:      This procedure was auto-finalized with no dictation required.            Results Review:    I reviewed the patient's new clinical results.  I reviewed the patient's new imaging results and agree with the interpretation.    Assessment & Plan       Intractable abdominal pain  Mesenteric lymphadenopathy    68-year-old lady presents with 1 day of epigastric pain and CT findings of mesenteric lymphadenopathy.  At an outlying hospital it was noted that she had a lipase of 950 and it is only 83 here.  She is tender in the  epigastrium.    Her constellation of symptoms are unclear to me.  I would like to obtain a gastroenterology consult to see if they have an opinion on what is causing her constellation of symptoms or if they think endoscopy would be of value.        Lizbet Lopez MD  11/23/24  09:05 EST

## 2024-11-24 ENCOUNTER — INPATIENT HOSPITAL (OUTPATIENT)
Age: 69
End: 2024-11-24
Payer: MEDICARE

## 2024-11-24 ENCOUNTER — INPATIENT HOSPITAL (OUTPATIENT)
Dept: URBAN - METROPOLITAN AREA HOSPITAL 84 | Facility: HOSPITAL | Age: 69
End: 2024-11-24
Payer: MEDICARE

## 2024-11-24 VITALS
OXYGEN SATURATION: 96 % | RESPIRATION RATE: 15 BRPM | HEART RATE: 75 BPM | HEIGHT: 57 IN | TEMPERATURE: 97.6 F | WEIGHT: 170 LBS | DIASTOLIC BLOOD PRESSURE: 86 MMHG | SYSTOLIC BLOOD PRESSURE: 126 MMHG | BODY MASS INDEX: 36.68 KG/M2

## 2024-11-24 DIAGNOSIS — K21.9 GASTRO-ESOPHAGEAL REFLUX DISEASE WITHOUT ESOPHAGITIS: ICD-10-CM

## 2024-11-24 DIAGNOSIS — R10.9 UNSPECIFIED ABDOMINAL PAIN: ICD-10-CM

## 2024-11-24 DIAGNOSIS — Z90.49 ACQUIRED ABSENCE OF OTHER SPECIFIED PARTS OF DIGESTIVE TRACT: ICD-10-CM

## 2024-11-24 DIAGNOSIS — R93.3 ABNORMAL FINDINGS ON DIAGNOSTIC IMAGING OF OTHER PARTS OF DI: ICD-10-CM

## 2024-11-24 DIAGNOSIS — R74.8 ABNORMAL LEVELS OF OTHER SERUM ENZYMES: ICD-10-CM

## 2024-11-24 LAB
ALBUMIN SERPL-MCNC: 3.5 G/DL (ref 3.5–5.2)
ALBUMIN/GLOB SERPL: 1.3 G/DL
ALP SERPL-CCNC: 73 U/L (ref 39–117)
ALT SERPL W P-5'-P-CCNC: 12 U/L (ref 1–33)
ANION GAP SERPL CALCULATED.3IONS-SCNC: 9 MMOL/L (ref 5–15)
AST SERPL-CCNC: 13 U/L (ref 1–32)
BASOPHILS # BLD AUTO: 0.02 10*3/MM3 (ref 0–0.2)
BASOPHILS NFR BLD AUTO: 0.2 % (ref 0–1.5)
BILIRUB SERPL-MCNC: 0.2 MG/DL (ref 0–1.2)
BUN SERPL-MCNC: 26 MG/DL (ref 8–23)
BUN/CREAT SERPL: 33.3 (ref 7–25)
CALCIUM SPEC-SCNC: 8.8 MG/DL (ref 8.6–10.5)
CHLORIDE SERPL-SCNC: 102 MMOL/L (ref 98–107)
CO2 SERPL-SCNC: 25 MMOL/L (ref 22–29)
CREAT SERPL-MCNC: 0.78 MG/DL (ref 0.57–1)
DEPRECATED RDW RBC AUTO: 44 FL (ref 37–54)
EGFRCR SERPLBLD CKD-EPI 2021: 81.8 ML/MIN/1.73
EOSINOPHIL # BLD AUTO: 0.05 10*3/MM3 (ref 0–0.4)
EOSINOPHIL NFR BLD AUTO: 0.5 % (ref 0.3–6.2)
ERYTHROCYTE [DISTWIDTH] IN BLOOD BY AUTOMATED COUNT: 12.6 % (ref 12.3–15.4)
GLOBULIN UR ELPH-MCNC: 2.6 GM/DL
GLUCOSE SERPL-MCNC: 245 MG/DL (ref 65–99)
HCT VFR BLD AUTO: 35.9 % (ref 34–46.6)
HGB BLD-MCNC: 11.8 G/DL (ref 12–15.9)
IMM GRANULOCYTES # BLD AUTO: 0.07 10*3/MM3 (ref 0–0.05)
IMM GRANULOCYTES NFR BLD AUTO: 0.7 % (ref 0–0.5)
LIPASE SERPL-CCNC: 140 U/L (ref 13–60)
LYMPHOCYTES # BLD AUTO: 1.56 10*3/MM3 (ref 0.7–3.1)
LYMPHOCYTES NFR BLD AUTO: 14.9 % (ref 19.6–45.3)
MAGNESIUM SERPL-MCNC: 2.1 MG/DL (ref 1.6–2.4)
MCH RBC QN AUTO: 31.2 PG (ref 26.6–33)
MCHC RBC AUTO-ENTMCNC: 32.9 G/DL (ref 31.5–35.7)
MCV RBC AUTO: 95 FL (ref 79–97)
MONOCYTES # BLD AUTO: 0.82 10*3/MM3 (ref 0.1–0.9)
MONOCYTES NFR BLD AUTO: 7.8 % (ref 5–12)
NEUTROPHILS NFR BLD AUTO: 7.98 10*3/MM3 (ref 1.7–7)
NEUTROPHILS NFR BLD AUTO: 75.9 % (ref 42.7–76)
NRBC BLD AUTO-RTO: 0 /100 WBC (ref 0–0.2)
PHOSPHATE SERPL-MCNC: 2.6 MG/DL (ref 2.5–4.5)
PLATELET # BLD AUTO: 250 10*3/MM3 (ref 140–450)
PMV BLD AUTO: 11.2 FL (ref 6–12)
POTASSIUM SERPL-SCNC: 4.1 MMOL/L (ref 3.5–5.2)
PROT SERPL-MCNC: 6.1 G/DL (ref 6–8.5)
RBC # BLD AUTO: 3.78 10*6/MM3 (ref 3.77–5.28)
SODIUM SERPL-SCNC: 136 MMOL/L (ref 136–145)
WBC NRBC COR # BLD AUTO: 10.5 10*3/MM3 (ref 3.4–10.8)

## 2024-11-24 PROCEDURE — 83735 ASSAY OF MAGNESIUM: CPT | Performed by: NURSE PRACTITIONER

## 2024-11-24 PROCEDURE — 83690 ASSAY OF LIPASE: CPT | Performed by: NURSE PRACTITIONER

## 2024-11-24 PROCEDURE — 84100 ASSAY OF PHOSPHORUS: CPT | Performed by: STUDENT IN AN ORGANIZED HEALTH CARE EDUCATION/TRAINING PROGRAM

## 2024-11-24 PROCEDURE — 99232 SBSQ HOSP IP/OBS MODERATE 35: CPT | Performed by: NURSE PRACTITIONER

## 2024-11-24 PROCEDURE — 99232 SBSQ HOSP IP/OBS MODERATE 35: CPT | Performed by: SURGERY

## 2024-11-24 PROCEDURE — 80053 COMPREHEN METABOLIC PANEL: CPT | Performed by: STUDENT IN AN ORGANIZED HEALTH CARE EDUCATION/TRAINING PROGRAM

## 2024-11-24 PROCEDURE — 85025 COMPLETE CBC W/AUTO DIFF WBC: CPT | Performed by: NURSE PRACTITIONER

## 2024-11-24 RX ORDER — PANTOPRAZOLE SODIUM 40 MG/1
40 TABLET, DELAYED RELEASE ORAL DAILY
Qty: 30 TABLET | Refills: 0 | Status: SHIPPED | OUTPATIENT
Start: 2024-11-24 | End: 2024-12-24

## 2024-11-24 RX ADMIN — PANTOPRAZOLE SODIUM 40 MG: 40 INJECTION, POWDER, FOR SOLUTION INTRAVENOUS at 05:51

## 2024-11-24 RX ADMIN — AMLODIPINE BESYLATE 5 MG: 5 TABLET ORAL at 09:25

## 2024-11-24 RX ADMIN — ATORVASTATIN CALCIUM 10 MG: 10 TABLET, FILM COATED ORAL at 09:24

## 2024-11-24 RX ADMIN — LEVOTHYROXINE SODIUM 75 MCG: 0.03 TABLET ORAL at 05:48

## 2024-11-24 RX ADMIN — ASPIRIN 81 MG: 81 TABLET, COATED ORAL at 09:24

## 2024-11-24 RX ADMIN — Medication 10 ML: at 09:30

## 2024-11-24 NOTE — PROGRESS NOTES
Bariatric Surgery Progress Note    Name: Brandie Maldonado ADMIT: 2024   : 1955  PCP: Stephanie Arciniega    MRN: [id number] LOS: 1 days   AGE/SEX: 68 y.o. female  ROOM: [facility]   Subjective   Feels better, no pain    Objective      Vital Signs  Vital Signs (range)  Temp:  [97.6 °F (36.4 °C)-98.3 °F (36.8 °C)] 97.6 °F (36.4 °C)  Heart Rate:  [66-92] 75  Resp:  [15-18] 15  BP: (107-126)/(66-89) 126/86  Intake/Output                   24 0701 - 24 0700     2047-1021 [id number] Total              Intake    P.O.  --  600 600    I.V.  --  1635 1635    Total Intake -- 2235 2235       Output    Urine  600  -- 600    Total Output 600 -- 600          I/O last 3 completed shifts:  In: 2235 [P.O.:600; I.V.:1635]  Out: 600 [Urine:600]    Physical Exam:    Awake and alert  Normal mental status  Normal pulmonary effort  Abdomen no  tenderness  Incisions no erythema  Extremities no tenderness or swelling      CBC    Results from last 7 days   Lab Units 24  0037 24  0230   WBC 10*3/mm3 10.50 11.49*   HEMOGLOBIN g/dL 11.8* 12.8   PLATELETS 10*3/mm3 250 214     CMP   Results from last 7 days   Lab Units 24  0037 24  0230   SODIUM mmol/L 136 135*   POTASSIUM mmol/L 4.1 4.9   CHLORIDE mmol/L 102 100   CO2 mmol/L 25.0 29.5*   BUN mg/dL 26* 12   CREATININE mg/dL 0.78 0.97   GLUCOSE mg/dL 245* 143*   ALBUMIN g/dL 3.5 3.9   BILIRUBIN mg/dL 0.2 0.8   ALK PHOS U/L 73 79   AST (SGOT) U/L 13 13   ALT (SGPT) U/L 12 14   AMYLASE U/L  --  78   LIPASE U/L 140* 88*       Assessment & Plan     Intractable abdominal pain  Pancreatitis    68-year-old lady presents with epigastric pain and elevated lipase and mesenteric lymphadenopathy.  Feeling much better today.    -She was seen by gastroenterology and likely had an episode of pancreatitis.  Will follow-up with gastroenterology as an outpatient.  No need to follow-up with me.    This note was created using Dragon Voice Recognition software.    Lizbet Lopez,  MD  11/24/24  14:50 EST

## 2024-11-24 NOTE — PLAN OF CARE
Goal Outcome Evaluation:         No complaints of abdominal pain throughout this shift. Tolerated dinner  without nausea/vomiting. Had several bowel movements. Heart/oxygen monitor in place. Plan of care is ongoing.

## 2024-11-24 NOTE — PLAN OF CARE
Goal Outcome Evaluation:      Patient Alert and Oriented x 4. Patient is able to make her needs known. Patient is able to ambulate around room and on unit. Patient is eating and tolerating well. Plan of care ongoing

## 2024-11-24 NOTE — PROGRESS NOTES
" LOS: 1 day   Patient Care Team:  Stephanie Arciniega as PCP - General (Nurse Practitioner)      Subjective   \" Feel great\"    Interval History:   LABS: 926, creatinine 0.78.  LFTs are normal.  CRP 8.89.  Lipase 140 (88).  WBCs 10.5, hemoglobin 11.8, platelets 250.  Patient eating 100% of her meal tray.  Denies any increased pain with eating or drinking.  Patient had multiple bowel movements yesterday afternoon and evening after sorbitol.  Was able to sleep after midnight.  She denies any nausea or vomiting.     ROS:   No chest pain, shortness of breath, or cough.         Medication Review:     Current Facility-Administered Medications:     acetaminophen (TYLENOL) tablet 650 mg, 650 mg, Oral, Q4H PRN, Consuelo Lantigua APRN    amLODIPine (NORVASC) tablet 5 mg, 5 mg, Oral, Daily, Consuelo Lantigua APRN, 5 mg at 11/24/24 0925    aspirin EC tablet 81 mg, 81 mg, Oral, Daily, Consuelo Lantigua APRN, 81 mg at 11/24/24 0924    atorvastatin (LIPITOR) tablet 10 mg, 10 mg, Oral, Daily, Consuelo Lantigua, APRN, 10 mg at 11/24/24 0924    sennosides-docusate (PERICOLACE) 8.6-50 MG per tablet 2 tablet, 2 tablet, Oral, BID PRN **AND** polyethylene glycol (MIRALAX) packet 17 g, 17 g, Oral, Daily PRN **AND** bisacodyl (DULCOLAX) EC tablet 5 mg, 5 mg, Oral, Daily PRN **AND** bisacodyl (DULCOLAX) suppository 10 mg, 10 mg, Rectal, Daily PRN, Consuelo Lantigau APRN    dicyclomine (BENTYL) capsule 20 mg, 20 mg, Oral, 4x Daily PRN, Pebbles Alston APRN    HYDROmorphone (DILAUDID) injection 0.5 mg, 0.5 mg, Intravenous, Q4H PRN, Consuelo Lantigua APRN    ketorolac (TORADOL) injection 15 mg, 15 mg, Intravenous, Q6H PRN, Consuelo Lantigua APRN    levothyroxine (SYNTHROID, LEVOTHROID) tablet 75 mcg, 75 mcg, Oral, Q AM, Consuelo Lantigua APRN, 75 mcg at 11/24/24 0548    Magnesium Standard Dose Replacement - Follow Nurse / BPA Driven Protocol, , Not Applicable, PRN, Consuelo Lantigua APRN    nitroglycerin (NITROSTAT) SL tablet 0.4 " mg, 0.4 mg, Sublingual, Q5 Min PRN, Consuelo Lantigua, APRN    ondansetron (ZOFRAN) injection 4 mg, 4 mg, Intravenous, Q6H PRN, Consuelo Lantigua, APRN    pantoprazole (PROTONIX) injection 40 mg, 40 mg, Intravenous, Q AM, Consuelo Lantigua, APRN, 40 mg at 11/24/24 0551    Phosphorus Replacement - Follow Nurse / BPA Driven Protocol, , Not Applicable, PRN, Consuelo Lantigua, APRN    Potassium Replacement - Follow Nurse / BPA Driven Protocol, , Not Applicable, PRN, Consuelo Lantigua, APRN    sodium chloride 0.9 % flush 10 mL, 10 mL, Intravenous, Q12H, Consuelo Lantigua APRN, 10 mL at 11/24/24 0930    sodium chloride 0.9 % flush 10 mL, 10 mL, Intravenous, PRN, Consuelo Lantigua, APRN    sodium chloride 0.9 % infusion 40 mL, 40 mL, Intravenous, PRN, Consuelo Lantigua, APRN      Objective Sting in bed.  Nurse at bedside.   at bedside.  NAD.    Vital Signs  Temp:  [97.6 °F (36.4 °C)-98.3 °F (36.8 °C)] 97.6 °F (36.4 °C)  Heart Rate:  [65-92] 75  Resp:  [15-18] 15  BP: (107-127)/(66-89) 126/86  Physical Exam:    General Appearance:    Awake and alert, in no acute distress   Head:    Normocephalic, without obvious abnormality   Eyes:          Conjunctivae normal, anicteric sclerae   Ears:    Hearing intact   Throat:   No oral lesions, no thrush, oral mucosa moist   Neck:   No adenopathy, supple, no JVD   Lungs:     respirations regular, even and unlabored        Abdomen:      soft, non-tender, no rebound or guarding, non-distended, no hepatosplenomegaly   Rectal:     Deferred   Extremities:   No edema, no cyanosis, no redness   Skin:   No bleeding, bruising or rash, no jaundice   Neurologic:   Cranial nerves 2 - 12 grossly intact, no asterixis, sensation   intact        Results Review:    CBC    Results from last 7 days   Lab Units 11/24/24  0037 11/23/24  0230   WBC 10*3/mm3 10.50 11.49*   HEMOGLOBIN g/dL 11.8* 12.8   PLATELETS 10*3/mm3 250 214     CMP   Results from last 7 days   Lab Units 11/24/24  0033  "11/23/24  0230   SODIUM mmol/L 136 135*   POTASSIUM mmol/L 4.1 4.9   CHLORIDE mmol/L 102 100   CO2 mmol/L 25.0 29.5*   BUN mg/dL 26* 12   CREATININE mg/dL 0.78 0.97   GLUCOSE mg/dL 245* 143*   ALBUMIN g/dL 3.5 3.9   BILIRUBIN mg/dL 0.2 0.8   ALK PHOS U/L 73 79   AST (SGOT) U/L 13 13   ALT (SGPT) U/L 12 14   MAGNESIUM mg/dL 2.1 2.0   PHOSPHORUS mg/dL 2.6 3.8   AMYLASE U/L  --  78   LIPASE U/L 140* 88*     Cr Clearance Estimated Creatinine Clearance: 61.6 mL/min (by C-G formula based on SCr of 0.78 mg/dL).  Coag   Results from last 7 days   Lab Units 11/23/24  0230   INR  1.01   APTT seconds 24.0     HbA1C   Lab Results   Component Value Date    HGBA1C 6.29 (H) 11/23/2024     Blood Glucose No results found for: \"POCGLU\"  Infection   Results from last 7 days   Lab Units 11/23/24  0230   PROCALCITONIN ng/mL 0.06     UA      Radiology(recent) XR Chest 1 View    Result Date: 11/23/2024  Impression: No acute process. Electronically Signed: Zeus Dennison MD  11/23/2024 5:12 PM EST  Workstation ID: [id number]           Assessment & Plan   Abdominal pain/flank pain-right suspicion for mild pancreatitis could be medication induced such as a atorvastatin  Abnormal CT showing hiatal hernia, iam mesentery  Elevated lipase  GERD  Cholecystectomy 2021  Hypothyroidism  Hypertension  COPD        PLAN:  Patient is 68-year-old female who presented to [facility] on 11/22/2024 for right sided abdominal pain, right flank pain, nausea x 1 day.  Patient was noted to have an elevated lipase of 950.  CT showed medium hiatal hernia and iam mesentery.  GI was consulted for epigastric pain, increased lipase, mesentery lymphadenopathy.      Lipase did increase overnight to 140 but patient is asymptomatic.  She is eating 100% of her meal and would like to go home.  Had multiple bowel movements yesterday after sorbitol.  Discussed large amount of stool seen on x-ray could contribute to her symptoms.  Discussed bowel regimen.  For discharge " from GI standpoint.  Would recommend she follow-up in our office so we can check on when she is due for recall for history of polyps.  We can also decide if she continues to have symptoms whether we need to proceed with an EUS.        Pebbles Alston, ZOHAIB  11/24/24  10:08 EST

## 2024-11-25 NOTE — CASE MANAGEMENT/SOCIAL WORK
Case Management Discharge Note      Final Note: Home         Selected Continued Care - Discharged on 11/24/2024 Admission date: 11/23/2024 - Discharge disposition: Home or Self Care       Transportation Services  Private: Car    Final Discharge Disposition Code: 01 - home or self-care

## 2024-11-28 NOTE — DISCHARGE SUMMARY
"             [facility] Medicine Services  Discharge Summary    Date of Service: 24  Patient Name: Brandie Maldonado  : 1955  MRN: [id number]    Date of Admission: 2024  Discharge Diagnosis:     #Intractable Nausea   #Moderate Hiatal hernia  #Iam [place] sign on CT on outside facility:  #pancreatitis meets 2/3 criteria for pancreatitis if elevated lipase outside facility  #GERD  #Recently treated for PNA  #s/p CCKectomy   #S/p hysterectomy   #s/p Appendectomy   #Hypothyroidism    Date of Discharge:  24  Primary Care Physician: Stephanie Arciniega      Presenting Problem:   Intractable abdominal pain [R10.9]    Active and Resolved Hospital Problems:  Active Hospital Problems    Diagnosis POA    **Intractable abdominal pain [R10.9] Yes      Resolved Hospital Problems   No resolved problems to display.         Hospital Course     HPI:  Per the H&P written by Consuelo Lantigua APRN , dated 24:  \" abdominal pain \"    Hospital Course:  68 y.o. female with PMH of GERD, hypothyroidism, HLD, HTN, cholecystectomy, hysterectomy, appendectomy who presented to OSH for right sided abdominal pain, right flank pain with nausea for 24 hours. OSH workup revealed WBC 13.3 and CT imaging of increased attenuation in mesentery with small to borderline size lymph nodes suggestive of \"iam mesentery\". Transfer to this facility requested for intractable abdominal pain.       Patient was treated with IV Fluids for possible pancreatitis, GI was consulted, cleared for discharge with outpatient follow up. Per GI: Could consider outpatient endoscopic ultrasound for further examination of the pancreas if her sxms does not improve. Patient advised to follow up with GI outpatient.     Surgery was consulted, no further work up inpatient, cleared for discharged, recommend to follow up outpatient with GI.     Patient is currently clinically stable.  Plan for discharge discussed with the patient prior to discharge, " patient agreed with the plan. Patient advised to return to hospital or go near by hospital or call 911, should patient's symptoms worsen or recur. Patient also advised to follow up with PCP within 1-5 days for recent hospitalization, monitoring and further management of patient's health problems. Patient acknowledged understanding and agreed with the discharge plan.     DISCHARGE Follow Up Recommendations for labs and diagnostics:     -Follow up outpatient with gastroenterology for further work up and management of your abdominal pain and findings on CT    -Follow up with your PCP for recent hospitalization       Reasons For Change In Medications and Indications for New Medications:      Day of Discharge     Vital Signs:       Physical Exam  General Appearance:  Awake alert   Head:  Atraumatic normocephalic   Eyes:        No sclera icterus   Neck: Normal ROM   Pulm: Clear to auscultation on my exam   Cardio: Heart sounds normal   Extremities: Moves all extremities   Abdomen: Soft tender in RLQ - Resolved on day of discharge, epigastric, tender to palpation, no rebound on my exam         Musculoskeletal: Moves all extremities   Neuro: Aaox3          Pertinent  and/or Most Recent Results     LAB RESULTS:      Lab 11/24/24  0037 11/23/24  0737 11/23/24  0230   WBC 10.50  --  11.49*   HEMOGLOBIN 11.8*  --  12.8   HEMATOCRIT 35.9  --  37.9   PLATELETS 250  --  214   NEUTROS ABS 7.98*  --  8.04*   IMMATURE GRANS (ABS) 0.07*  --   --    LYMPHS ABS 1.56  --   --    MONOS ABS 0.82  --   --    EOS ABS 0.05  --  0.34   MCV 95.0  --  95.5   CRP  --  8.89*  --    PROCALCITONIN  --   --  0.06   LACTATE  --   --  0.8   PROTIME  --   --  13.4   APTT  --   --  24.0         Lab 11/24/24  0037 11/23/24  0230   SODIUM 136 135*   POTASSIUM 4.1 4.9   CHLORIDE 102 100   CO2 25.0 29.5*   ANION GAP 9.0 5.5   BUN 26* 12   CREATININE 0.78 0.97   EGFR 81.8 63.0   GLUCOSE 245* 143*   CALCIUM 8.8 9.4   MAGNESIUM 2.1 2.0   PHOSPHORUS 2.6 3.8    HEMOGLOBIN A1C  --  6.29*   TSH  --  1.370         Lab 11/24/24  0037 11/23/24  0230   TOTAL PROTEIN 6.1 6.6   ALBUMIN 3.5 3.9   GLOBULIN 2.6 2.7   ALT (SGPT) 12 14   AST (SGOT) 13 13   BILIRUBIN 0.2 0.8   ALK PHOS 73 79   AMYLASE  --  78   LIPASE 140* 88*         Lab 11/23/24  0737 11/23/24  0454 11/23/24  0230   PROBNP  --   --  144.0   HSTROP T 8 12 7   PROTIME  --   --  13.4   INR  --   --  1.01         Lab 11/23/24  0230   CHOLESTEROL 161   LDL CHOL 78   HDL CHOL 70*   TRIGLYCERIDES 65             Brief Urine Lab Results       None          Microbiology Results (last 10 days)       ** No results found for the last 240 hours. **            XR Chest 1 View    Result Date: 11/23/2024  Impression: Impression: No acute process. Electronically Signed: Zeus Dennison MD  11/23/2024 5:12 PM EST  Workstation ID: [id number]                 Labs Pending at Discharge:  Pending Results       None            Procedures Performed           Consults:   Consults       No orders found from 10/25/2024 to 11/24/2024.              Discharge Details        Discharge Medications        New Medications        Instructions Start Date   pantoprazole 40 MG EC tablet  Commonly known as: PROTONIX   40 mg, Oral, Daily             Continue These Medications        Instructions Start Date   albuterol sulfate  (90 Base) MCG/ACT inhaler  Commonly known as: PROVENTIL HFA;VENTOLIN HFA;PROAIR HFA   1 puff, Inhalation, Every 4 Hours PRN      amLODIPine 5 MG tablet  Commonly known as: NORVASC   5 mg, Oral, Daily      aspirin 81 MG EC tablet   81 mg, Oral, Daily      atorvastatin 10 MG tablet  Commonly known as: LIPITOR   10 mg, Daily      levothyroxine 25 MCG tablet  Commonly known as: SYNTHROID, LEVOTHROID   25 mcg, Oral, Every Early Morning      levothyroxine 50 MCG tablet  Commonly known as: SYNTHROID, LEVOTHROID   50 mcg, Oral, Daily      vitamin B-12 1000 MCG tablet  Commonly known as: CYANOCOBALAMIN   1,000 mcg, Oral, Daily                No Known Allergies      Discharge Disposition:   Home or Self Care    Diet:  Hospital:No active diet order        Discharge Activity:   Activity Instructions    As tolerated             CODE STATUS:  Code Status and Medical Interventions: No CPR (Do Not Attempt to Resuscitate); Limited Support; No intubation (DNI)   Ordered at: 11/23/24 0116     Medical Intervention Limits:    No intubation (DNI)     Code Status (Patient has no pulse and is not breathing):    No CPR (Do Not Attempt to Resuscitate)     Medical Interventions (Patient has pulse or is breathing):    Limited Support         No future appointments.        Time spent on Discharge including face to face service:  >30 minutes    Part of this note may be an electronic transcription/translation of spoken language to printed text using the Dragon Dictation System.    Signature: Electronically signed by Fuad Gentile MD, 11/27/24, 21:36 EST.  [facility] Hospitalist Team

## 2025-01-29 ENCOUNTER — OFFICE (OUTPATIENT)
Dept: URBAN - METROPOLITAN AREA CLINIC 64 | Facility: CLINIC | Age: 70
End: 2025-01-29
Payer: MEDICARE

## 2025-01-29 VITALS
DIASTOLIC BLOOD PRESSURE: 78 MMHG | HEART RATE: 89 BPM | HEIGHT: 57 IN | WEIGHT: 172 LBS | SYSTOLIC BLOOD PRESSURE: 142 MMHG

## 2025-01-29 DIAGNOSIS — R10.11 RIGHT UPPER QUADRANT PAIN: ICD-10-CM

## 2025-01-29 DIAGNOSIS — R93.3 ABNORMAL FINDINGS ON DIAGNOSTIC IMAGING OF OTHER PARTS OF DI: ICD-10-CM

## 2025-01-29 DIAGNOSIS — K21.9 GASTRO-ESOPHAGEAL REFLUX DISEASE WITHOUT ESOPHAGITIS: ICD-10-CM

## 2025-01-29 DIAGNOSIS — R74.8 ABNORMAL LEVELS OF OTHER SERUM ENZYMES: ICD-10-CM

## 2025-01-29 PROCEDURE — 99213 OFFICE O/P EST LOW 20 MIN: CPT
